# Patient Record
Sex: FEMALE | Race: WHITE | NOT HISPANIC OR LATINO | URBAN - METROPOLITAN AREA
[De-identification: names, ages, dates, MRNs, and addresses within clinical notes are randomized per-mention and may not be internally consistent; named-entity substitution may affect disease eponyms.]

---

## 2019-02-11 ENCOUNTER — Encounter (OUTPATIENT)
Dept: URBAN - METROPOLITAN AREA CLINIC 72 | Facility: CLINIC | Age: 64
End: 2019-02-11
Payer: COMMERCIAL

## 2019-02-11 PROCEDURE — 92004 COMPRE OPH EXAM NEW PT 1/>: CPT | Performed by: OPTOMETRIST

## 2019-02-11 PROCEDURE — 92133 CPTRZD OPH DX IMG PST SGM ON: CPT | Performed by: OPTOMETRIST

## 2019-03-04 ENCOUNTER — Encounter (OUTPATIENT)
Dept: URBAN - METROPOLITAN AREA CLINIC 72 | Facility: CLINIC | Age: 64
End: 2019-03-04
Payer: COMMERCIAL

## 2019-03-04 PROCEDURE — 92134 CPTRZ OPH DX IMG PST SGM RTA: CPT | Performed by: OPTOMETRIST

## 2019-03-04 PROCEDURE — 99214 OFFICE O/P EST MOD 30 MIN: CPT | Performed by: OPTOMETRIST

## 2019-04-29 ENCOUNTER — Encounter (OUTPATIENT)
Dept: URBAN - METROPOLITAN AREA CLINIC 72 | Facility: CLINIC | Age: 64
End: 2019-04-29
Payer: COMMERCIAL

## 2019-04-29 PROCEDURE — 92134 CPTRZ OPH DX IMG PST SGM RTA: CPT | Performed by: OPTOMETRIST

## 2019-04-29 PROCEDURE — 99213 OFFICE O/P EST LOW 20 MIN: CPT | Performed by: OPTOMETRIST

## 2019-06-24 ENCOUNTER — Encounter (OUTPATIENT)
Dept: URBAN - METROPOLITAN AREA CLINIC 72 | Facility: CLINIC | Age: 64
End: 2019-06-24
Payer: COMMERCIAL

## 2019-06-24 PROCEDURE — 99214 OFFICE O/P EST MOD 30 MIN: CPT | Performed by: OPTOMETRIST

## 2019-06-24 PROCEDURE — 92134 CPTRZ OPH DX IMG PST SGM RTA: CPT | Performed by: OPTOMETRIST

## 2019-10-07 ENCOUNTER — Encounter (OUTPATIENT)
Dept: URBAN - METROPOLITAN AREA CLINIC 72 | Facility: CLINIC | Age: 64
End: 2019-10-07
Payer: COMMERCIAL

## 2019-10-07 PROCEDURE — 92014 COMPRE OPH EXAM EST PT 1/>: CPT | Performed by: OPTOMETRIST

## 2019-10-07 PROCEDURE — 92133 CPTRZD OPH DX IMG PST SGM ON: CPT | Performed by: OPTOMETRIST

## 2020-12-31 ENCOUNTER — OFFICE VISIT (OUTPATIENT)
Dept: URBAN - METROPOLITAN AREA CLINIC 72 | Facility: CLINIC | Age: 65
End: 2020-12-31
Payer: COMMERCIAL

## 2020-12-31 DIAGNOSIS — H40.033 ANATOMICAL NARROW ANGLE, BILATERAL: ICD-10-CM

## 2020-12-31 DIAGNOSIS — H25.813 COMBINED FORMS OF AGE-RELATED CATARACT, BILATERAL: Primary | ICD-10-CM

## 2020-12-31 PROCEDURE — 92014 COMPRE OPH EXAM EST PT 1/>: CPT | Performed by: OPTOMETRIST

## 2020-12-31 ASSESSMENT — INTRAOCULAR PRESSURE
OD: 17
OS: 18

## 2020-12-31 NOTE — IMPRESSION/PLAN
Impression: Anatomical narrow angle, bilateral: H40.033. S/p LPI. PI is open OU Plan: Pt has had LPI OU.  Stable Will continue to monitor,

## 2021-10-11 ENCOUNTER — OFFICE VISIT (OUTPATIENT)
Dept: URBAN - METROPOLITAN AREA CLINIC 72 | Facility: CLINIC | Age: 66
End: 2021-10-11
Payer: COMMERCIAL

## 2021-10-11 DIAGNOSIS — H35.81 RETINAL EDEMA: Primary | ICD-10-CM

## 2021-10-11 PROCEDURE — 92134 CPTRZ OPH DX IMG PST SGM RTA: CPT | Performed by: OPTOMETRIST

## 2021-10-11 PROCEDURE — 99213 OFFICE O/P EST LOW 20 MIN: CPT | Performed by: OPTOMETRIST

## 2021-10-11 ASSESSMENT — INTRAOCULAR PRESSURE
OS: 15
OD: 17

## 2022-02-10 ENCOUNTER — OFFICE VISIT (OUTPATIENT)
Dept: URBAN - METROPOLITAN AREA CLINIC 72 | Facility: CLINIC | Age: 67
End: 2022-02-10
Payer: COMMERCIAL

## 2022-02-10 DIAGNOSIS — H35.711 CENTRAL SEROUS CHORIORETINOPATHY, RIGHT EYE: ICD-10-CM

## 2022-02-10 PROCEDURE — 99213 OFFICE O/P EST LOW 20 MIN: CPT | Performed by: OPTOMETRIST

## 2022-02-10 PROCEDURE — 92134 CPTRZ OPH DX IMG PST SGM RTA: CPT | Performed by: OPTOMETRIST

## 2022-02-10 ASSESSMENT — INTRAOCULAR PRESSURE
OD: 14
OS: 13

## 2022-02-10 NOTE — IMPRESSION/PLAN
Impression: Retinal edema: H35.81. Previously diagnosed as  Pt states that she is getting better OCT ordered and done today Edema and PED OD WNL OS. Plan: Discussed DX in detail with pt. Discussed treatment options Monitoring VS going back to retina for angio Pt voiced understanding Pt wishes to monitor Will monitor in 3 months

## 2022-05-10 ENCOUNTER — OFFICE VISIT (OUTPATIENT)
Dept: URBAN - METROPOLITAN AREA CLINIC 72 | Facility: CLINIC | Age: 67
End: 2022-05-10
Payer: COMMERCIAL

## 2022-05-10 DIAGNOSIS — H43.813 VITREOUS DEGENERATION, BILATERAL: ICD-10-CM

## 2022-05-10 DIAGNOSIS — H25.813 COMBINED FORMS OF AGE-RELATED CATARACT, BILATERAL: ICD-10-CM

## 2022-05-10 DIAGNOSIS — H52.4 PRESBYOPIA: ICD-10-CM

## 2022-05-10 DIAGNOSIS — H43.823 VITREOMACULAR ADHESION, BILATERAL: ICD-10-CM

## 2022-05-10 DIAGNOSIS — H35.711 CENTRAL SEROUS CHORIORETINOPATHY, RIGHT EYE: Primary | ICD-10-CM

## 2022-05-10 PROCEDURE — 92134 CPTRZ OPH DX IMG PST SGM RTA: CPT | Performed by: OPTOMETRIST

## 2022-05-10 PROCEDURE — 99212 OFFICE O/P EST SF 10 MIN: CPT | Performed by: OPTOMETRIST

## 2022-05-10 ASSESSMENT — INTRAOCULAR PRESSURE
OD: 18
OS: 17

## 2022-05-10 NOTE — IMPRESSION/PLAN
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 OCT ordered and done today Large central PED with improving fluid OD Partial PVD and VMA OU Plan: Discussed DX in detail with pt Explained that pt is not going to see well due to the large central PED OD, the PED is from the chronic , and there is no treatment for the  Also educated pt on the scarring of the PED makes it more likely to remain stable.  
Pt voices understanding

## 2022-05-10 NOTE — IMPRESSION/PLAN
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 Plan: Discussed Dx of posterior vitreous detachment. Discussed signs and symptoms of retinal tear/detachment. Pt to RTC PRN with any change to visual status.

## 2022-10-20 ENCOUNTER — OFFICE VISIT (OUTPATIENT)
Dept: URBAN - METROPOLITAN AREA CLINIC 72 | Facility: CLINIC | Age: 67
End: 2022-10-20
Payer: COMMERCIAL

## 2022-10-20 DIAGNOSIS — H25.813 COMBINED FORMS OF AGE-RELATED CATARACT, BILATERAL: ICD-10-CM

## 2022-10-20 DIAGNOSIS — H43.813 VITREOUS DEGENERATION, BILATERAL: ICD-10-CM

## 2022-10-20 DIAGNOSIS — H43.823 VITREOMACULAR ADHESION, BILATERAL: ICD-10-CM

## 2022-10-20 DIAGNOSIS — H35.711 CENTRAL SEROUS CHORIORETINOPATHY, RIGHT EYE: ICD-10-CM

## 2022-10-20 DIAGNOSIS — H35.051 RETINAL NEOVASCULARIZATION OF RIGHT EYE: Primary | ICD-10-CM

## 2022-10-20 PROCEDURE — 99214 OFFICE O/P EST MOD 30 MIN: CPT | Performed by: OPTOMETRIST

## 2022-10-20 PROCEDURE — 92134 CPTRZ OPH DX IMG PST SGM RTA: CPT | Performed by: OPTOMETRIST

## 2022-10-20 ASSESSMENT — INTRAOCULAR PRESSURE
OS: 16
OD: 16

## 2022-10-20 NOTE — IMPRESSION/PLAN
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 Recommend pt to go back to see Dr Mykel Harrington at [facility] for consult and treatment Educated pt on injection and possible outcomes Pt understands and wishes to proceed